# Patient Record
Sex: MALE | Race: BLACK OR AFRICAN AMERICAN | NOT HISPANIC OR LATINO | Employment: PART TIME | URBAN - METROPOLITAN AREA
[De-identification: names, ages, dates, MRNs, and addresses within clinical notes are randomized per-mention and may not be internally consistent; named-entity substitution may affect disease eponyms.]

---

## 2024-03-03 ENCOUNTER — APPOINTMENT (OUTPATIENT)
Dept: CT IMAGING | Facility: CLINIC | Age: 19
End: 2024-03-03
Attending: FAMILY MEDICINE
Payer: COMMERCIAL

## 2024-03-03 ENCOUNTER — HOSPITAL ENCOUNTER (EMERGENCY)
Facility: CLINIC | Age: 19
Discharge: HOME | End: 2024-03-03
Attending: FAMILY MEDICINE | Admitting: FAMILY MEDICINE
Payer: COMMERCIAL

## 2024-03-03 VITALS
DIASTOLIC BLOOD PRESSURE: 99 MMHG | OXYGEN SATURATION: 98 % | TEMPERATURE: 98.1 F | HEART RATE: 104 BPM | RESPIRATION RATE: 16 BRPM | SYSTOLIC BLOOD PRESSURE: 163 MMHG

## 2024-03-03 DIAGNOSIS — F12.10 MARIJUANA ABUSE: ICD-10-CM

## 2024-03-03 DIAGNOSIS — F29 PSYCHOSIS, UNSPECIFIED PSYCHOSIS TYPE (H): ICD-10-CM

## 2024-03-03 DIAGNOSIS — T62.0X4A: ICD-10-CM

## 2024-03-03 PROBLEM — F41.9 ANXIETY: Status: ACTIVE | Noted: 2024-03-03

## 2024-03-03 PROBLEM — F19.99: Status: ACTIVE | Noted: 2024-03-03

## 2024-03-03 LAB
ALBUMIN SERPL BCG-MCNC: 4.9 G/DL (ref 3.5–5.2)
ALP SERPL-CCNC: 75 U/L (ref 40–150)
ALT SERPL W P-5'-P-CCNC: 17 U/L (ref 0–70)
AMPHETAMINES UR QL SCN: ABNORMAL
ANION GAP SERPL CALCULATED.3IONS-SCNC: 15 MMOL/L (ref 7–15)
AST SERPL W P-5'-P-CCNC: 55 U/L (ref 0–45)
BARBITURATES UR QL SCN: ABNORMAL
BASOPHILS # BLD AUTO: 0.1 10E3/UL (ref 0–0.2)
BASOPHILS NFR BLD AUTO: 1 %
BENZODIAZ UR QL SCN: ABNORMAL
BILIRUB SERPL-MCNC: 0.8 MG/DL
BUN SERPL-MCNC: 12.6 MG/DL (ref 6–20)
BZE UR QL SCN: ABNORMAL
CALCIUM SERPL-MCNC: 10 MG/DL (ref 8.6–10)
CANNABINOIDS UR QL SCN: ABNORMAL
CHLORIDE SERPL-SCNC: 99 MMOL/L (ref 98–107)
CREAT SERPL-MCNC: 1.29 MG/DL (ref 0.67–1.17)
DEPRECATED HCO3 PLAS-SCNC: 22 MMOL/L (ref 22–29)
EGFRCR SERPLBLD CKD-EPI 2021: 81 ML/MIN/1.73M2
EOSINOPHIL # BLD AUTO: 0 10E3/UL (ref 0–0.7)
EOSINOPHIL NFR BLD AUTO: 1 %
ERYTHROCYTE [DISTWIDTH] IN BLOOD BY AUTOMATED COUNT: 12.5 % (ref 10–15)
FENTANYL UR QL: ABNORMAL
GLUCOSE SERPL-MCNC: 87 MG/DL (ref 70–99)
HCT VFR BLD AUTO: 47.4 % (ref 40–53)
HGB BLD-MCNC: 16.6 G/DL (ref 13.3–17.7)
IMM GRANULOCYTES # BLD: 0 10E3/UL
IMM GRANULOCYTES NFR BLD: 0 %
LYMPHOCYTES # BLD AUTO: 1.3 10E3/UL (ref 0.8–5.3)
LYMPHOCYTES NFR BLD AUTO: 20 %
MCH RBC QN AUTO: 28.5 PG (ref 26.5–33)
MCHC RBC AUTO-ENTMCNC: 35 G/DL (ref 31.5–36.5)
MCV RBC AUTO: 81 FL (ref 78–100)
MONOCYTES # BLD AUTO: 0.5 10E3/UL (ref 0–1.3)
MONOCYTES NFR BLD AUTO: 8 %
NEUTROPHILS # BLD AUTO: 4.6 10E3/UL (ref 1.6–8.3)
NEUTROPHILS NFR BLD AUTO: 70 %
NRBC # BLD AUTO: 0 10E3/UL
NRBC BLD AUTO-RTO: 0 /100
OPIATES UR QL SCN: ABNORMAL
PCP QUAL URINE (ROCHE): ABNORMAL
PLATELET # BLD AUTO: 267 10E3/UL (ref 150–450)
POTASSIUM SERPL-SCNC: 3.7 MMOL/L (ref 3.4–5.3)
PROT SERPL-MCNC: 8.2 G/DL (ref 6.4–8.3)
RBC # BLD AUTO: 5.82 10E6/UL (ref 4.4–5.9)
SODIUM SERPL-SCNC: 136 MMOL/L (ref 135–145)
TSH SERPL DL<=0.005 MIU/L-ACNC: 1.72 UIU/ML (ref 0.3–4.2)
WBC # BLD AUTO: 6.5 10E3/UL (ref 4–11)

## 2024-03-03 PROCEDURE — 80307 DRUG TEST PRSMV CHEM ANLYZR: CPT | Performed by: FAMILY MEDICINE

## 2024-03-03 PROCEDURE — 70450 CT HEAD/BRAIN W/O DYE: CPT

## 2024-03-03 PROCEDURE — 99284 EMERGENCY DEPT VISIT MOD MDM: CPT | Mod: 25 | Performed by: FAMILY MEDICINE

## 2024-03-03 PROCEDURE — 250N000013 HC RX MED GY IP 250 OP 250 PS 637: Performed by: FAMILY MEDICINE

## 2024-03-03 PROCEDURE — 99285 EMERGENCY DEPT VISIT HI MDM: CPT | Performed by: FAMILY MEDICINE

## 2024-03-03 PROCEDURE — 82565 ASSAY OF CREATININE: CPT | Performed by: FAMILY MEDICINE

## 2024-03-03 PROCEDURE — 84443 ASSAY THYROID STIM HORMONE: CPT | Performed by: FAMILY MEDICINE

## 2024-03-03 PROCEDURE — 36415 COLL VENOUS BLD VENIPUNCTURE: CPT | Performed by: FAMILY MEDICINE

## 2024-03-03 PROCEDURE — 85025 COMPLETE CBC W/AUTO DIFF WBC: CPT | Performed by: FAMILY MEDICINE

## 2024-03-03 RX ORDER — OLANZAPINE 5 MG/1
5 TABLET, ORALLY DISINTEGRATING ORAL AT BEDTIME
Qty: 30 TABLET | Refills: 0 | Status: SHIPPED | OUTPATIENT
Start: 2024-03-03

## 2024-03-03 RX ORDER — OLANZAPINE 5 MG/1
5 TABLET, ORALLY DISINTEGRATING ORAL ONCE
Status: COMPLETED | OUTPATIENT
Start: 2024-03-03 | End: 2024-03-03

## 2024-03-03 RX ADMIN — OLANZAPINE 5 MG: 5 TABLET, ORALLY DISINTEGRATING ORAL at 14:35

## 2024-03-03 ASSESSMENT — ACTIVITIES OF DAILY LIVING (ADL)
ADLS_ACUITY_SCORE: 35

## 2024-03-03 ASSESSMENT — COLUMBIA-SUICIDE SEVERITY RATING SCALE - C-SSRS
5. HAVE YOU STARTED TO WORK OUT OR WORKED OUT THE DETAILS OF HOW TO KILL YOURSELF? DO YOU INTEND TO CARRY OUT THIS PLAN?: NO
3. HAVE YOU BEEN THINKING ABOUT HOW YOU MIGHT KILL YOURSELF?: NO
6. HAVE YOU EVER DONE ANYTHING, STARTED TO DO ANYTHING, OR PREPARED TO DO ANYTHING TO END YOUR LIFE?: NO
4. HAVE YOU HAD THESE THOUGHTS AND HAD SOME INTENTION OF ACTING ON THEM?: NO
1. IN THE PAST MONTH, HAVE YOU WISHED YOU WERE DEAD OR WISHED YOU COULD GO TO SLEEP AND NOT WAKE UP?: NO
2. HAVE YOU ACTUALLY HAD ANY THOUGHTS OF KILLING YOURSELF IN THE PAST MONTH?: NO

## 2024-03-03 NOTE — ED PROVIDER NOTES
ED Provider Note  [facility]      History     Chief Complaint   Patient presents with    Psychiatric Evaluation     Experiencing what family considers is a psychotic break. AH regarding religiosity, and hearing the voice of God and can't tell what's real and what's not. Felt like his losing touch with reality.       HPI  Duane M Bogne is a 18 year old male presents to the emergency department for psychiatric evaluation.    Per chart review, patient was seen at [facility] emergency department last night, 3/2/2024, for psychiatric evaluation.  Was hypersexual, trying to masturbate in public, hyperreligious, and upon arrival patient was unable to say anything that make sense.  Symptoms were first noticed on Thursday, 2/29/2024, however symptoms escalated yesterday.  Patient was also experiencing auditory hallucinations, stating that he could hear God.  Patient also endorsed mushroom use in the last 3 days.  Patient has also had significant marijuana use until February 28.  He was initially seen as noted in [facility] however in light of the fact that he goes to school and seeing a lot of they opted to have him brought here to [place] for psychiatric evaluation and possible resources.    Patient took mushrooms on the 27th. Patient has 2 mentors at bedside from [facility] and his scholarship program.    Past Medical History  No past medical history on file.  No past surgical history on file.  OLANZapine zydis (ZYPREXA) 5 MG ODT      No Known Allergies  Family History  No family history on file.  Social History          A medically appropriate review of systems was performed with pertinent positives and negatives noted in the HPI, and all other systems negative.    Physical Exam   BP: (!) 163/99  Pulse: 104  Temp: 98.1  F (36.7  C)  Resp: 16  SpO2: 98 %  Physical Exam  Constitutional:       General: He is not in acute distress.     Appearance: Normal appearance. He is not  toxic-appearing.   HENT:      Head: Atraumatic.   Eyes:      General: No scleral icterus.     Conjunctiva/sclera: Conjunctivae normal.   Cardiovascular:      Rate and Rhythm: Normal rate.      Heart sounds: Normal heart sounds.   Pulmonary:      Effort: Pulmonary effort is normal. No respiratory distress.      Breath sounds: Normal breath sounds.   Abdominal:      Palpations: Abdomen is soft.      Tenderness: There is no abdominal tenderness.   Musculoskeletal:         General: No deformity.      Cervical back: Neck supple.   Skin:     General: Skin is warm.   Neurological:      General: No focal deficit present.      Mental Status: He is alert and oriented to person, place, and time.      Sensory: No sensory deficit.      Motor: No weakness.      Coordination: Coordination normal.   Psychiatric:         Mood and Affect: Mood is anxious.         Speech: Speech normal.         Behavior: Behavior is cooperative.         Thought Content: Thought content is paranoid and delusional.           ED Course, Procedures, & Data      Procedures       Results for orders placed or performed during the hospital encounter of 03/03/24   CT Head w/o Contrast     Status: None    Narrative    EXAM: CT HEAD W/O CONTRAST  LOCATION: [facility]  DATE: 3/3/2024    INDICATION: psychosis  COMPARISON: None.  TECHNIQUE: Routine CT Head without IV contrast. Multiplanar reformats. Dose reduction techniques were used.    FINDINGS:  No evidence of hemorrhage, mass, or hydrocephalus. Parenchyma within normal limits for age. No acute osseous abnormality.      Impression    IMPRESSION:  1.  No acute intracranial abnormality.   Comprehensive metabolic panel     Status: Abnormal   Result Value Ref Range    Sodium 136 135 - 145 mmol/L    Potassium 3.7 3.4 - 5.3 mmol/L    Carbon Dioxide (CO2) 22 22 - 29 mmol/L    Anion Gap 15 7 - 15 mmol/L    Urea Nitrogen 12.6 6.0 - 20.0 mg/dL    Creatinine 1.29 (H) 0.67 -  1.17 mg/dL    GFR Estimate 81 >60 mL/min/1.73m2    Calcium 10.0 8.6 - 10.0 mg/dL    Chloride 99 98 - 107 mmol/L    Glucose 87 70 - 99 mg/dL    Alkaline Phosphatase 75 40 - 150 U/L    AST 55 (H) 0 - 45 U/L    ALT 17 0 - 70 U/L    Protein Total 8.2 6.4 - 8.3 g/dL    Albumin 4.9 3.5 - 5.2 g/dL    Bilirubin Total 0.8 <=1.2 mg/dL   TSH with free T4 reflex     Status: Normal   Result Value Ref Range    TSH 1.72 0.30 - 4.20 uIU/mL   CBC with platelets and differential     Status: None   Result Value Ref Range    WBC Count 6.5 4.0 - 11.0 10e3/uL    RBC Count 5.82 4.40 - 5.90 10e6/uL    Hemoglobin 16.6 13.3 - 17.7 g/dL    Hematocrit 47.4 40.0 - 53.0 %    MCV 81 78 - 100 fL    MCH 28.5 26.5 - 33.0 pg    MCHC 35.0 31.5 - 36.5 g/dL    RDW 12.5 10.0 - 15.0 %    Platelet Count 267 150 - 450 10e3/uL    % Neutrophils 70 %    % Lymphocytes 20 %    % Monocytes 8 %    % Eosinophils 1 %    % Basophils 1 %    % Immature Granulocytes 0 %    NRBCs per 100 WBC 0 <1 /100    Absolute Neutrophils 4.6 1.6 - 8.3 10e3/uL    Absolute Lymphocytes 1.3 0.8 - 5.3 10e3/uL    Absolute Monocytes 0.5 0.0 - 1.3 10e3/uL    Absolute Eosinophils 0.0 0.0 - 0.7 10e3/uL    Absolute Basophils 0.1 0.0 - 0.2 10e3/uL    Absolute Immature Granulocytes 0.0 <=0.4 10e3/uL    Absolute NRBCs 0.0 10e3/uL   Urine Drug Screen Panel     Status: Abnormal   Result Value Ref Range    Amphetamines Urine Screen Negative Screen Negative    Barbituates Urine Screen Negative Screen Negative    Benzodiazepine Urine Screen Negative Screen Negative    Cannabinoids Urine Screen Positive (A) Screen Negative    Cocaine Urine Screen Negative Screen Negative    Fentanyl Qual Urine Screen Negative Screen Negative    Opiates Urine Screen Negative Screen Negative    PCP Urine Screen Negative Screen Negative   CBC with platelets differential     Status: None    Narrative    The following orders were created for panel order CBC with platelets differential.  Procedure                                Abnormality         Status                     ---------                               -----------         ------                     CBC with platelets and d...[[id number]]                      Final result                 Please view results for these tests on the individual orders.   Urine Drug Screen     Status: Abnormal    Narrative    The following orders were created for panel order Urine Drug Screen.  Procedure                               Abnormality         Status                     ---------                               -----------         ------                     Urine Drug Screen Panel[[id number]]      Abnormal            Final result                 Please view results for these tests on the individual orders.     Medications   OLANZapine zydis (zyPREXA) ODT tab 5 mg (5 mg Oral $Given 3/3/24 5369)     Labs Ordered and Resulted from Time of ED Arrival to Time of ED Departure   COMPREHENSIVE METABOLIC PANEL - Abnormal       Result Value    Sodium 136      Potassium 3.7      Carbon Dioxide (CO2) 22      Anion Gap 15      Urea Nitrogen 12.6      Creatinine 1.29 (*)     GFR Estimate 81      Calcium 10.0      Chloride 99      Glucose 87      Alkaline Phosphatase 75      AST 55 (*)     ALT 17      Protein Total 8.2      Albumin 4.9      Bilirubin Total 0.8     URINE DRUG SCREEN PANEL - Abnormal    Amphetamines Urine Screen Negative      Barbituates Urine Screen Negative      Benzodiazepine Urine Screen Negative      Cannabinoids Urine Screen Positive (*)     Cocaine Urine Screen Negative      Fentanyl Qual Urine Screen Negative      Opiates Urine Screen Negative      PCP Urine Screen Negative     TSH WITH FREE T4 REFLEX - Normal    TSH 1.72     CBC WITH PLATELETS AND DIFFERENTIAL    WBC Count 6.5      RBC Count 5.82      Hemoglobin 16.6      Hematocrit 47.4      MCV 81      MCH 28.5      MCHC 35.0      RDW 12.5      Platelet Count 267      % Neutrophils 70      % Lymphocytes 20      % Monocytes 8      %  Eosinophils 1      % Basophils 1      % Immature Granulocytes 0      NRBCs per 100 WBC 0      Absolute Neutrophils 4.6      Absolute Lymphocytes 1.3      Absolute Monocytes 0.5      Absolute Eosinophils 0.0      Absolute Basophils 0.1      Absolute Immature Granulocytes 0.0      Absolute NRBCs 0.0       CT Head w/o Contrast   Final Result   IMPRESSION:   1.  No acute intracranial abnormality.             Critical care was not performed.     Medical Decision Making  The patient's presentation was of moderate complexity (an acute illness with systemic symptoms).    The patient's evaluation involved:  an assessment requiring an independent historian (patient's 2 measures were reviewed as independent historians)  ordering and/or review of 3+ test(s) in this encounter (see separate area of note for details)  discussion of management or test interpretation with another health professional (patient was seen and evaluated by independent professional with full DEC assessment discussion of inpatient hospitalization versus outpatient management.  At this time patient and support system feel confident that they are able to maintain safety and patient is going to follow-up with outpatient services.)    The patient's management necessitated high risk (a decision regarding hospitalization).    Assessment & Plan        I have reviewed the nursing notes. I have reviewed the findings, diagnosis, plan and need for follow up with the patient.    Discharge Medication List as of 3/3/2024  4:29 PM        START taking these medications    Details   OLANZapine zydis (ZYPREXA) 5 MG ODT Take 1 tablet (5 mg) by mouth at bedtime, Disp-30 tablet, R-0, [facility]         Patient presenting with psychosis likely related to his hallucinogen and marijuana abuse he is able to be in a safe place has full support systems within send all of and at this time will be discharged with outpatient services they have been instructed to start Zyprexa having  received first dose in the emergency room will have 1 repeat dose tonight and then take 5 mg at night for at least the next week to week and a half until he can be seen in follow-up if he has marked increase in symptoms they will return to the emergency room.    Final diagnoses:   Psychosis, unspecified psychosis type (H)   Toxic effect of ingested mushroom, undetermined intent, initial encounter   Marijuana abuse         [facility] EMERGENCY DEPARTMENT  3/3/2024     Arben Caballero MD  03/04/24 1688

## 2024-03-03 NOTE — ED TRIAGE NOTES
Experiencing what family considers is a psychotic break. AH regarding religiosity, and hearing the voice of God and can't tell what's real and what's not. Felt like his losing touch with reality.       Triage Assessment (Adult)       Row Name 03/03/24 1959          Triage Assessment    Airway WDL WDL        Respiratory WDL    Respiratory WDL WDL        Skin Circulation/Temperature WDL    Skin Circulation/Temperature WDL WDL        Cardiac WDL    Cardiac WDL WDL        Peripheral/Neurovascular WDL    Peripheral Neurovascular WDL WDL        Cognitive/Neuro/Behavioral WDL    Cognitive/Neuro/Behavioral WDL WDL

## 2024-03-03 NOTE — CONSULTS
"Diagnostic Evaluation Consultation  Crisis Assessment    Patient Name: Duane M Bogne  Age:  18 year old  Legal Sex: male  Gender Identity: male  Pronouns:   Race: Black or   Ethnicity: Not  or   Language: English      Patient was assessed: In person      Patient location: [facility] EMERGENCY DEPARTMENT                             [facility]-E    Referral Data and Chief Complaint  Duane M Bogne presents to the ED with family/friends. Patient is presenting to the ED for the following concerns: Significant behavioral change.   Factors that make the mental health crisis life threatening or complex are:  Pt presents to ED for concerns of altered mental status. Pt here with his \"mentors\" from [facility]. Pt was in [place] with mentorship group over past weekend for a conference and the group noticed a behavioral change in patient. He has recently become hyper-Yarsani. Pt believes that he might be the next Messiah  and is now devoting his life to Lul, pt states. Pt took mushrooms on January 28th. He was also using marijuana daily, but stopped on the 28th after using mushrooms. Pt likely experiencing some form of a substance induced psychotic episode. Pt reports struggling to sleep, eating, and drink water since taking Mushrooms. He identifies that he got a good night of rest last night and is now focused on taking better care of himself so he doesn't scare his mentorship group. Pt has no mental health history. He is well-known to the [place] community and plays on the football team. Pt denies SI, HI, NSSIB. He denies hallucinations and paranoid thinking. He reports depression in the past but has managed well. Pt is pleasant and engaged. He is hyper-focused on Confucianist throughout assessment..      Informed Consent and Assessment Methods  Explained the crisis assessment process, including applicable information disclosures and limits to confidentiality, assessed understanding of " the process, and obtained consent to proceed with the assessment.  Assessment methods included conducting a formal interview with patient, review of medical records, collaboration with medical staff, and obtaining relevant collateral information from family and community providers when available.  : done     Patient response to interventions: verbalizes understanding  Coping skills were attempted to reduce the crisis:  talking with mentors,  seeking out ED, taking zyprexa     History of the Crisis   No mental health history. Pt has family in [place] and [facility]. Pt attends [employer], currently living in the dorms with roomates. Pt reports hx of feeling depressed or down. He also believes he has ADHD. Has hx of using marijuana daily but stopped on January 28th after ingesting magic mushrooms.    Brief Psychosocial History  Family:  Single, Children no  Support System:  Parent(s) ([facility])  Employment Status:  student  Source of Income:  none  Financial Environmental Concerns:  none  Current Hobbies:   (football, Moravian)  Barriers in Personal Life:  mental health concerns    Significant Clinical History  Current Anxiety Symptoms:  anxious  Current Depression/Trauma:  crying or feels like crying  Current Somatic Symptoms:     Current Psychosis/Thought Disturbance:   (hyperfocused on Moravian)  Current Eating Symptoms:     Chemical Use History:  Alcohol: None  Benzodiazepines: None  Opiates: None  Cocaine: None  Marijuana: Occasional (Pt reports last time was 2/26)  Other Use:  (Magic Mushrooms on Feb 28th)   Past diagnosis:  No known past diagnosis  Family history:  No known history of mental health or chemical health concerns  Past treatment:  Individual therapy, Primary Care  Details of most recent treatment:  meets with [employer] counselor on occassion  Other relevant history:          Collateral Information  Is there collateral information:  (Spoke with pt's mentor from [employer]. He offered  information consistent with what pt provided. [place] plans to set pt up with [facility] resources.)     Collateral information name, relationship, phone number:       What happened today:       What is different about patient's functioning:       Concern about alcohol/drug use:      What do you think the patient needs:      Has patient made comments about wanting to kill themselves/others:      If d/c is recommended, can they take part in safety/aftercare planning:       Additional collateral information:        Risk Assessment  [place] Suicide Severity Rating Scale Full Clinical Version:  Suicidal Ideation  Q6 Suicide Behavior (Lifetime): no          [place] Suicide Severity Rating Scale Recent:   Suicidal Ideation (Recent)  Q1 Wished to be Dead (Past Month): no  Q2 Suicidal Thoughts (Past Month): no  Q3 Suicidal Thought Method: no  Q4 Suicidal Intent without Specific Plan: no  Q5 Suicide Intent with Specific Plan: no  Level of Risk per Screen: no risks indicated          Environmental or Psychosocial Events: other life stressors  Protective Factors: Protective Factors: strong bond to family unit, community support, or employment, lives in a responsibly safe and stable environment, responsibilities and duties to others, including pets and children, sense of importance of health and wellness, good treatment engagement, help seeking, supportive ongoing medical and mental health care relationships, good problem-solving, coping, and conflict resolution skills, optimistic outlook - identification of future goals    Does the patient have thoughts of harming others? Feels Like Hurting Others: no  Previous Attempt to Hurt Others: no  Is the patient engaging in sexually inappropriate behavior?: no    Is the patient engaging in sexually inappropriate behavior?  no        Mental Status Exam   Affect: Appropriate  Appearance: Appropriate  Attention Span/Concentration: Attentive  Eye Contact: Engaged    Fund of  Knowledge: Appropriate   Language /Speech Content: Fluent  Language /Speech Volume: Normal  Language /Speech Rate/Productions: Normal  Recent Memory: Variable  Remote Memory: Variable  Mood: Anxious  Orientation to Person: Yes   Orientation to Place: Yes  Orientation to Time of Day: Yes  Orientation to Date: Yes     Situation (Do they understand why they are here?): Yes  Psychomotor Behavior: Normal  Thought Content: Clear  Thought Form: Obsessive/Perseverative        Medication  Psychotropic medications:   Medication Orders - Psychiatric (From admission, onward)      None             Current Care Team  Patient Care Team:  No Ref-Primary, Physician as PCP - General    Diagnosis  Patient Active Problem List   Diagnosis Code    Anxiety F41.9    Psychoactive substance-induced disorder (H) F19.99       Primary Problem This Admission  Active Hospital Problems    *Anxiety      Psychoactive substance-induced disorder (H)        Clinical Summary and Substantiation of Recommendations   Pt presents to ED for altered. Since consuming mushrooms on February 28th, pt has become hyper-Episcopal and had difficulties sleeping. Pt's mentorship team wanted him to receive an evaluation. Pt engaged and pleasant. Denies SI, HI, NSSIB. Recomme pt stay in ED for observation and psychiatric consult for stabilization. Pt declines offer, does not have interest in staying in ED. Pt declines referrals for psych/therapy. He plans to connect with [place] mental health offices. Pt's mentor will support him in this. Pt not at increased risk for harming himself or others. He will discharge back to [place].                          Patient coping skills attempted to reduce the crisis:  talking with mentors,  seeking out ED, taking zyprexa    Disposition  Recommended disposition: Observation        Reviewed case and recommendations with attending provider. Attending Name: Dr. Caballero       Attending concurs with disposition: yes       Patient  and/or validated legal guardian concurs with disposition:   no (Pt declines services, prefers to discharge home)       Final disposition:  discharge    Legal status on admission: Voluntary/Patient has signed consent for treatment    Assessment Details   Total duration spent with the patient: 35 min     CPT code(s) utilized: 12855 - Psychotherapy for Crisis - 60 (30-74*) min    HALEIGH Sosa, Psychotherapist  DEC - Triage & Transition Services  Callback: [phone]

## 2024-03-03 NOTE — DISCHARGE INSTRUCTIONS
Discharge back to [place] with supports in place taking Zyprexa as directed at night may use during the day if needed follow-up with outpatient services as discussed.  Return if increased symptoms